# Patient Record
(demographics unavailable — no encounter records)

---

## 2025-04-18 NOTE — HISTORY OF PRESENT ILLNESS
[Gradual] : gradual [3] : 3 [1] : 2 [Dull/Aching] : dull/aching [Radiating] : radiating [Sharp] : sharp [Tightness] : tightness [Constant] : constant [Leisure] : leisure [Social interactions] : social interactions [Rest] : rest [Sitting] : sitting [Physical therapy] : physical therapy [Exercising] : exercising [de-identified] : Pt. returns for his ankles. He has a history of right ankle stiffness and left achilles tendinitis. He previously did PT and was doing well. WB in supportive sneakers. He reports left ankle is doing well unless he runs. He gets start up pain on the right. No trauma or injury reported.  [] : Post Surgical Visit: no [FreeTextEntry1] : B/L Ankles [FreeTextEntry7] : radiates up the left calf

## 2025-04-18 NOTE — ASSESSMENT
[FreeTextEntry1] : Pt. is improving and recommend he continue with PT and home exercises.\par  WB in supportive footwear is recommended. \par  Ice to affected area. \par

## 2025-04-18 NOTE — PHYSICAL EXAM
[Left] : left foot and ankle [NL 30)] : inversion 30 degrees [Right] : right foot and ankle [5___] : eversion 5[unfilled]/5 [2+] : posterior tibialis pulse: 2+ [Normal] : saphenous nerve sensation normal [NL (40)] : plantar flexion 40 degrees [] : non-antalgic [Bilateral] : ankle bilaterally [Weight -] : weightbearing [FreeTextEntry8] : Mild tenderness anterior aspect medial malleolus.  [FreeTextEntry9] :  AP, mortise and lateral xrays of the ankle were taken and reviewed today. No change compared to prior 2022 films.  [de-identified] : plantar flexion 30 degrees [de-identified] : inversion 20 degrees [de-identified] : eversion 15 degrees [TWNoteComboBox7] : dorsiflexion 15 degrees

## 2025-04-18 NOTE — DISCUSSION/SUMMARY
[de-identified] : WBAT in supportive footwear. Pt. recommended a course of PT.  Physical therapy was prescribed for 2-3 times per week for four weeks. Stretching exercises demonstrated and recommended. Ice to affected area. Activity modification.   The patient was prescribed meloxicam 15mg daily as needed for pain.  They were explained the potential risks of GI pain/bleeding as well as hypertension.  They were told not to take any other nsaids while taking this medication.

## 2025-05-12 NOTE — PHYSICAL EXAM
[Right] : right knee [NL (0)] : extension 0 degrees [5___] : hamstring 5[unfilled]/5 [Negative] : negative Patience's [Left] : left knee [Positive] : positive Nida [] : patient ambulates without assistive device [FreeTextEntry8] : mild hamstring tenderness  [TWNoteComboBox7] : flexion 110 degrees [de-identified] : extension 3 degrees

## 2025-05-12 NOTE — HISTORY OF PRESENT ILLNESS
[Result of repetitive motion] : result of repetitive motion [9] : 9 [7] : 7 [Dull/Aching] : dull/aching [Constant] : constant [Social interactions] : social interactions [Full time] : Work status: full time [de-identified] : Fell playing with kids 5.10.25. He twisted both knees. The left knee feels unstable and as if it is going to buckle. Right hamstring is painful. [] : Post Surgical Visit: no [de-identified] : Risk management

## 2025-05-12 NOTE — PHYSICAL EXAM
[Right] : right knee [NL (0)] : extension 0 degrees [5___] : hamstring 5[unfilled]/5 [Negative] : negative Patience's [Left] : left knee [Positive] : positive Nida [] : patient ambulates without assistive device [FreeTextEntry8] : mild hamstring tenderness  [TWNoteComboBox7] : flexion 110 degrees [de-identified] : extension 3 degrees

## 2025-05-12 NOTE — DISCUSSION/SUMMARY
[Medication Risks Reviewed] : Medication risks reviewed [de-identified] : General Dx Discussion The patient was advised of the diagnosis. The natural history of the pathology was explained in full to the patient in layman's terms. All questions were answered. The risks and benefits of surgical and non-surgical treatment alternatives were explained in full to the patient.  will get a mri lt knee to eval for MCL/ meniscal tear to determine surgery  will try mobic 15 mg po qd for a few days with food  will try flexeril  will  get a hinged brace for stability  Patient was given a prescription for an anti-inflammatory medication.  They will take it for the next 5-7 days and then on an as needed basis, as long as there are no medical contra-indications.  Patient is counseled on possible GI and blood pressure side effects.

## 2025-05-12 NOTE — HISTORY OF PRESENT ILLNESS
[Result of repetitive motion] : result of repetitive motion [9] : 9 [7] : 7 [Dull/Aching] : dull/aching [Constant] : constant [Social interactions] : social interactions [Full time] : Work status: full time [de-identified] : Fell playing with kids 5.10.25. He twisted both knees. The left knee feels unstable and as if it is going to buckle. Right hamstring is painful. [] : Post Surgical Visit: no [de-identified] : Risk management

## 2025-05-12 NOTE — DISCUSSION/SUMMARY
[Medication Risks Reviewed] : Medication risks reviewed [de-identified] : General Dx Discussion The patient was advised of the diagnosis. The natural history of the pathology was explained in full to the patient in layman's terms. All questions were answered. The risks and benefits of surgical and non-surgical treatment alternatives were explained in full to the patient.  will get a mri lt knee to eval for MCL/ meniscal tear to determine surgery  will try mobic 15 mg po qd for a few days with food  will try flexeril  will  get a hinged brace for stability  Patient was given a prescription for an anti-inflammatory medication.  They will take it for the next 5-7 days and then on an as needed basis, as long as there are no medical contra-indications.  Patient is counseled on possible GI and blood pressure side effects.

## 2025-05-15 NOTE — PHYSICAL EXAM
[Right] : right knee [NL (0)] : extension 0 degrees [Negative] : negative Patience's [Left] : left knee [Positive] : positive Nida [5___] : hamstring 5[unfilled]/5 [] : no pain with varus stress [FreeTextEntry8] : mild hamstring tenderness  [TWNoteComboBox7] : flexion 110 degrees [de-identified] : extension 3 degrees

## 2025-05-15 NOTE — DISCUSSION/SUMMARY
[Medication Risks Reviewed] : Medication risks reviewed [de-identified] : General Dx Discussion The patient was advised of the diagnosis. The natural history of the pathology was explained in full to the patient in layman's terms. All questions were answered. The risks and benefits of surgical and non-surgical treatment alternatives were explained in full to the patient.  cont HKB  has not picked upo his medication will get PT will try mobic for a few days  Patient was given a prescription for an anti-inflammatory medication.  They will take it for the next 5-7 days and then on an as needed basis, as long as there are no medical contra-indications.  Patient is counseled on possible GI and blood pressure side effects.

## 2025-05-15 NOTE — HISTORY OF PRESENT ILLNESS
[Result of repetitive motion] : result of repetitive motion [9] : 9 [7] : 7 [Dull/Aching] : dull/aching [Constant] : constant [Full time] : Work status: full time [Leisure] : leisure [Sleep] : sleep [de-identified] : 05/15/2025: Patient is here for MRI results for his left knee. Pain is unchanged. [] : Post Surgical Visit: no [FreeTextEntry9] : GUNNAR [de-identified] : certain movements [de-identified] : GUNNAR [de-identified] : Risk management

## 2025-05-15 NOTE — DATA REVIEWED
[MRI] : MRI [Left] : left [Knee] : knee [Report was reviewed and noted in the chart] : The report was reviewed and noted in the chart [I independently reviewed and interpreted images and report] : I independently reviewed and interpreted images and report [I reviewed the films/CD] : I reviewed the films/CD [FreeTextEntry1] : MCL sprain retinacular strain  synovitis

## 2025-06-23 NOTE — HISTORY OF PRESENT ILLNESS
[de-identified] : follow up for knee / hamstring, knee is doing well. bit of limitans. state not much improvement with hamstring discomfort. stated no pt at this time, finished 9 sessions, had bit of relief, stated function. flexion in hamstring has not fully improved. no numbness or tingling. stated bending sensation causes to much pain in hamstring.

## 2025-06-23 NOTE — PHYSICAL EXAM
[Right] : right knee [NL (0)] : extension 0 degrees [Left] : left knee [5___] : hamstring 5[unfilled]/5 [Negative] : negative Patience's [] : patient ambulates without assistive device [FreeTextEntry8] : mild hamstring tenderness  [TWNoteComboBox7] : flexion 120 degrees [de-identified] : extension 0 degrees

## 2025-06-23 NOTE — DISCUSSION/SUMMARY
[Medication Risks Reviewed] : Medication risks reviewed [de-identified] : General Dx Discussion The patient was advised of the diagnosis. The natural history of the pathology was explained in full to the patient in layman's terms. All questions were answered. The risks and benefits of surgical and non-surgical treatment alternatives were explained in full to the patient.  will get Pt for his hamstrings  on rt side to improve his function and motion  f/u 6-8 weeks

## 2025-06-23 NOTE — PHYSICAL EXAM
[Right] : right knee [NL (0)] : extension 0 degrees [Left] : left knee [5___] : hamstring 5[unfilled]/5 [Negative] : negative Patience's [] : patient ambulates without assistive device [FreeTextEntry8] : mild hamstring tenderness  [TWNoteComboBox7] : flexion 120 degrees [de-identified] : extension 0 degrees

## 2025-06-23 NOTE — REASON FOR VISIT
[FreeTextEntry2] : left knee pain / hamstring  overall pain has decreased over 50% functioning  60% better

## 2025-06-23 NOTE — HISTORY OF PRESENT ILLNESS
[de-identified] : follow up for knee / hamstring, knee is doing well. bit of limitans. state not much improvement with hamstring discomfort. stated no pt at this time, finished 9 sessions, had bit of relief, stated function. flexion in hamstring has not fully improved. no numbness or tingling. stated bending sensation causes to much pain in hamstring.

## 2025-06-23 NOTE — DISCUSSION/SUMMARY
[Medication Risks Reviewed] : Medication risks reviewed [de-identified] : General Dx Discussion The patient was advised of the diagnosis. The natural history of the pathology was explained in full to the patient in layman's terms. All questions were answered. The risks and benefits of surgical and non-surgical treatment alternatives were explained in full to the patient.  will get Pt for his hamstrings  on rt side to improve his function and motion  f/u 6-8 weeks